# Patient Record
Sex: MALE | Race: WHITE | NOT HISPANIC OR LATINO | URBAN - METROPOLITAN AREA
[De-identification: names, ages, dates, MRNs, and addresses within clinical notes are randomized per-mention and may not be internally consistent; named-entity substitution may affect disease eponyms.]

---

## 2018-06-21 ENCOUNTER — OUTPATIENT (OUTPATIENT)
Dept: OUTPATIENT SERVICES | Facility: HOSPITAL | Age: 68
LOS: 1 days | Discharge: HOME | End: 2018-06-21

## 2018-06-21 DIAGNOSIS — G63 POLYNEUROPATHY IN DISEASES CLASSIFIED ELSEWHERE: ICD-10-CM

## 2018-06-21 DIAGNOSIS — G56.23 LESION OF ULNAR NERVE, BILATERAL UPPER LIMBS: ICD-10-CM

## 2018-06-21 DIAGNOSIS — G72.9 MYOPATHY, UNSPECIFIED: ICD-10-CM

## 2018-06-21 DIAGNOSIS — G82.20 PARAPLEGIA, UNSPECIFIED: ICD-10-CM

## 2018-07-06 ENCOUNTER — OUTPATIENT (OUTPATIENT)
Dept: OUTPATIENT SERVICES | Facility: HOSPITAL | Age: 68
LOS: 1 days | Discharge: HOME | End: 2018-07-06

## 2018-07-06 DIAGNOSIS — D84.9 IMMUNODEFICIENCY, UNSPECIFIED: ICD-10-CM

## 2018-07-06 DIAGNOSIS — A77.40 EHRLICHIOSIS, UNSPECIFIED: ICD-10-CM

## 2018-07-06 DIAGNOSIS — B60.0 BABESIOSIS: ICD-10-CM

## 2018-07-06 DIAGNOSIS — A44.9 BARTONELLOSIS, UNSPECIFIED: ICD-10-CM

## 2018-07-06 DIAGNOSIS — D84.1 DEFECTS IN THE COMPLEMENT SYSTEM: ICD-10-CM

## 2018-07-06 DIAGNOSIS — D68.62 LUPUS ANTICOAGULANT SYNDROME: ICD-10-CM

## 2018-12-31 ENCOUNTER — EMERGENCY (EMERGENCY)
Facility: HOSPITAL | Age: 68
LOS: 0 days | Discharge: HOME | End: 2018-12-31
Attending: EMERGENCY MEDICINE | Admitting: EMERGENCY MEDICINE

## 2018-12-31 VITALS
TEMPERATURE: 101 F | SYSTOLIC BLOOD PRESSURE: 179 MMHG | HEART RATE: 74 BPM | OXYGEN SATURATION: 98 % | RESPIRATION RATE: 18 BRPM | DIASTOLIC BLOOD PRESSURE: 89 MMHG
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
## 2018-12-31 DIAGNOSIS — R53.1 WEAKNESS: ICD-10-CM
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
## 2018-12-31 DIAGNOSIS — G61.0 GUILLAIN-BARRE SYNDROME: ICD-10-CM

## 2018-12-31 DIAGNOSIS — R50.9 FEVER, UNSPECIFIED: ICD-10-CM

## 2018-12-31 DIAGNOSIS — S00.90XA UNSPECIFIED SUPERFICIAL INJURY OF UNSPECIFIED PART OF HEAD, INITIAL ENCOUNTER: ICD-10-CM

## 2018-12-31 DIAGNOSIS — S00.03XA CONTUSION OF SCALP, INITIAL ENCOUNTER: ICD-10-CM

## 2018-12-31 DIAGNOSIS — G35 MULTIPLE SCLEROSIS: ICD-10-CM
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
## 2018-12-31 LAB
ALBUMIN SERPL ELPH-MCNC: 3.5 G/DL — SIGNIFICANT CHANGE UP (ref 3.5–5.2)
ALP SERPL-CCNC: 55 U/L — SIGNIFICANT CHANGE UP (ref 30–115)
ALT FLD-CCNC: 20 U/L — SIGNIFICANT CHANGE UP (ref 0–41)
ANION GAP SERPL CALC-SCNC: 14 MMOL/L — SIGNIFICANT CHANGE UP (ref 7–14)
APPEARANCE UR: CLEAR — SIGNIFICANT CHANGE UP
AST SERPL-CCNC: 27 U/L — SIGNIFICANT CHANGE UP (ref 0–41)
BASOPHILS # BLD AUTO: 0.04 K/UL — SIGNIFICANT CHANGE UP (ref 0–0.2)
BASOPHILS NFR BLD AUTO: 0.4 % — SIGNIFICANT CHANGE UP (ref 0–1)
BILIRUB SERPL-MCNC: 0.5 MG/DL — SIGNIFICANT CHANGE UP (ref 0.2–1.2)
BILIRUB UR-MCNC: ABNORMAL
BUN SERPL-MCNC: 24 MG/DL — HIGH (ref 10–20)
CALCIUM SERPL-MCNC: 8.6 MG/DL — SIGNIFICANT CHANGE UP (ref 8.5–10.1)
CHLORIDE SERPL-SCNC: 104 MMOL/L — SIGNIFICANT CHANGE UP (ref 98–110)
CO2 SERPL-SCNC: 21 MMOL/L — SIGNIFICANT CHANGE UP (ref 17–32)
COLOR SPEC: YELLOW — SIGNIFICANT CHANGE UP
CREAT SERPL-MCNC: 0.9 MG/DL — SIGNIFICANT CHANGE UP (ref 0.7–1.5)
DIFF PNL FLD: NEGATIVE — SIGNIFICANT CHANGE UP
EOSINOPHIL # BLD AUTO: 0.03 K/UL — SIGNIFICANT CHANGE UP (ref 0–0.7)
EOSINOPHIL NFR BLD AUTO: 0.3 % — SIGNIFICANT CHANGE UP (ref 0–8)
EPI CELLS # UR: ABNORMAL /HPF
GLUCOSE SERPL-MCNC: 105 MG/DL — HIGH (ref 70–99)
GLUCOSE UR QL: NEGATIVE — SIGNIFICANT CHANGE UP
HCT VFR BLD CALC: 35.8 % — LOW (ref 42–52)
HGB BLD-MCNC: 10.8 G/DL — LOW (ref 14–18)
IMM GRANULOCYTES NFR BLD AUTO: 0.4 % — HIGH (ref 0.1–0.3)
KETONES UR-MCNC: NEGATIVE — SIGNIFICANT CHANGE UP
LACTATE SERPL-SCNC: 2.1 MMOL/L — SIGNIFICANT CHANGE UP (ref 0.5–2.2)
LEUKOCYTE ESTERASE UR-ACNC: NEGATIVE — SIGNIFICANT CHANGE UP
LIDOCAIN IGE QN: 29 U/L — SIGNIFICANT CHANGE UP (ref 7–60)
LYMPHOCYTES # BLD AUTO: 0.88 K/UL — LOW (ref 1.2–3.4)
LYMPHOCYTES # BLD AUTO: 9.1 % — LOW (ref 20.5–51.1)
MCHC RBC-ENTMCNC: 18.9 PG — LOW (ref 27–31)
MCHC RBC-ENTMCNC: 30.2 G/DL — LOW (ref 32–37)
MCV RBC AUTO: 62.7 FL — LOW (ref 80–94)
MONOCYTES # BLD AUTO: 0.45 K/UL — SIGNIFICANT CHANGE UP (ref 0.1–0.6)
MONOCYTES NFR BLD AUTO: 4.7 % — SIGNIFICANT CHANGE UP (ref 1.7–9.3)
NEUTROPHILS # BLD AUTO: 8.23 K/UL — HIGH (ref 1.4–6.5)
NEUTROPHILS NFR BLD AUTO: 85.1 % — HIGH (ref 42.2–75.2)
NITRITE UR-MCNC: NEGATIVE — SIGNIFICANT CHANGE UP
NRBC # BLD: 0 /100 WBCS — SIGNIFICANT CHANGE UP (ref 0–0)
PH UR: 5.5 — SIGNIFICANT CHANGE UP (ref 5–8)
PLATELET # BLD AUTO: 184 K/UL — SIGNIFICANT CHANGE UP (ref 130–400)
POTASSIUM SERPL-MCNC: 4.3 MMOL/L — SIGNIFICANT CHANGE UP (ref 3.5–5)
POTASSIUM SERPL-SCNC: 4.3 MMOL/L — SIGNIFICANT CHANGE UP (ref 3.5–5)
PROT SERPL-MCNC: 8.8 G/DL — HIGH (ref 6–8)
PROT UR-MCNC: 30
RBC # BLD: 5.71 M/UL — SIGNIFICANT CHANGE UP (ref 4.7–6.1)
RBC # FLD: 17.8 % — HIGH (ref 11.5–14.5)
SODIUM SERPL-SCNC: 139 MMOL/L — SIGNIFICANT CHANGE UP (ref 135–146)
SP GR SPEC: >=1.03 — SIGNIFICANT CHANGE UP (ref 1.01–1.03)
UROBILINOGEN FLD QL: 1 (ref 0.2–0.2)
WBC # BLD: 9.67 K/UL — SIGNIFICANT CHANGE UP (ref 4.8–10.8)
WBC # FLD AUTO: 9.67 K/UL — SIGNIFICANT CHANGE UP (ref 4.8–10.8)

## 2018-12-31 RX ORDER — SODIUM CHLORIDE 9 MG/ML
1000 INJECTION INTRAMUSCULAR; INTRAVENOUS; SUBCUTANEOUS ONCE
Qty: 0 | Refills: 0 | Status: COMPLETED | OUTPATIENT
Start: 2018-12-31 | End: 2018-12-31

## 2018-12-31 RX ORDER — ACETAMINOPHEN 500 MG
650 TABLET ORAL ONCE
Qty: 0 | Refills: 0 | Status: COMPLETED | OUTPATIENT
Start: 2018-12-31 | End: 2018-12-31

## 2018-12-31 RX ADMIN — SODIUM CHLORIDE 1000 MILLILITER(S): 9 INJECTION INTRAMUSCULAR; INTRAVENOUS; SUBCUTANEOUS at 11:44

## 2018-12-31 RX ADMIN — Medication 650 MILLIGRAM(S): at 11:17

## 2018-12-31 NOTE — ED ADULT NURSE NOTE - CAS EDN DISCHARGE INTERVENTIONS
IV placed in hospital removed; pt had IV in place upon arrival for IVIG infusion; IV intact on arrival left in place on discharge for home infusion. MD aware./IV discontinued, cath removed intact

## 2018-12-31 NOTE — ED PROVIDER NOTE - CARE PROVIDERS DIRECT ADDRESSES
[email or web address]

## 2018-12-31 NOTE — ED PROVIDER NOTE - CARE PLAN
Principal Discharge DX:	Fall  Secondary Diagnosis:	Closed head injury  Secondary Diagnosis:	Fever  Secondary Diagnosis:	Guillain-[place]  Secondary Diagnosis:	MS (multiple sclerosis)

## 2018-12-31 NOTE — ED ADULT NURSE NOTE - NSIMPLEMENTINTERV_GEN_ALL_ED
Implemented All Fall with Harm Risk Interventions:  [place] to call system. Call bell, personal items and telephone within reach. Instruct patient to call for assistance. Room bathroom lighting operational. Non-slip footwear when patient is off stretcher. Physically safe environment: no spills, clutter or unnecessary equipment. Stretcher in lowest position, wheels locked, appropriate side rails in place. Provide visual cue, wrist band, yellow gown, etc. Monitor gait and stability. Monitor for mental status changes and reorient to person, place, and time. Review medications for side effects contributing to fall risk. Reinforce activity limits and safety measures with patient and family. Provide visual clues: red socks.

## 2018-12-31 NOTE — ED ADULT TRIAGE NOTE - SPO2 (%)
----- Message from Jamey Weber MD sent at 7/10/2017 10:09 PM CDT -----  Please notify patient with results. Hb / Hct better. Repeat CBC in 6 weeks     98

## 2018-12-31 NOTE — ED ADULT TRIAGE NOTE - CHIEF COMPLAINT QUOTE
Slid and fell down x 10 steps to basement. Denies LOC. Not taking any anticoagulant. Slid and fell down x 10 steps to basement. Denies LOC. Not taking any anticoagulant.   in Triage.

## 2018-12-31 NOTE — ED PROVIDER NOTE - OBJECTIVE STATEMENT
69 y/o male with hx of MS, Guillain-[place]arré, on 5 day infusion of Ig. Today was walking down flight of steps and fell. States he was feeling weak and lost his balance. No chest pain, palpitations, dizziness prior to fall. No LOC. No N/V. 67 y/o male with hx of MS, Guillain-Barré, on 5 day infusion of Ig. Today was walking down flight of steps and fell. States he was feeling weak and lost his balance. No chest pain, palpitations, dizziness prior to fall. No LOC. No N/V. Sustained hematoma to scalp.  Has been complaining of generalized weakness for few days. Found to have low grade fever in ED.

## 2018-12-31 NOTE — ED PROVIDER NOTE - PHYSICAL EXAMINATION
+ hematoma to top of scalp. No c-spine tenderness. PERRL. EOMI. Mildly dry mucous membranes. Lungs CTA and equal b/l. ABD soft, no tenderness. Pelvis stable, no hip tenderness. Extremities with no bony tenderness, no deformity. Back: No vertebral tenderness. Neuro: A&Ox3, GCS 15. CN II-XII intact, strength 3/5 L arm, 5/5 right arm, 4/5 b/l legs, sensation intact and equal, finger-to-nose intact on right, unable to perform on left.  Imp: 1. Mechanical Fall A/P: CT head, c-spine, CXR, pelvis X-ray  2. Fever Ddx includes infection, side-effect of Ig infusion. A/P: Labs, lactate, CXR, U/A. IV fluids, anti-pyretics.   Will re-assess.

## 2018-12-31 NOTE — ED ADULT NURSE REASSESSMENT NOTE - NS ED NURSE REASSESS COMMENT FT1
Per patient's wife, spoke to patient's neurologist, states they will d/c at home treatment for Guillian[place], okay to take out IV that patient came in with. IV discontinued. Dr. Sepulveda aware.

## 2018-12-31 NOTE — ED PROVIDER NOTE - MEDICAL DECISION MAKING DETAILS
Imaging with no acute injury. (Possible chronic SDH). No sign of infection. Fever likely secondary to Ig infusion. Can D/C. Will f/u with [facility] and neurology Dr. Palomino. Imaging with no acute injury. (Possible chronic SDH). Temperature and lactate noted. No sign of infection. Fever likely secondary to Ig infusion. Can D/C. Will f/u with GIRISH Osorio and neurology Dr. Palomino.

## 2018-12-31 NOTE — ED PROVIDER NOTE - CARE PROVIDER_API CALL
Scott Osorio (MD), [id number]  [address] [place] [address]  Phone: [phone]  Fax: [phone]    Ronald Palomino), Neurology; Neurophysiology  [address] [place] [address]  Phone: [phone]  Fax: [phone]

## 2019-05-30 ENCOUNTER — OUTPATIENT (OUTPATIENT)
Dept: OUTPATIENT SERVICES | Facility: HOSPITAL | Age: 69
LOS: 1 days | Discharge: HOME | End: 2019-05-30

## 2019-05-30 DIAGNOSIS — G35 MULTIPLE SCLEROSIS: ICD-10-CM

## 2019-05-30 DIAGNOSIS — G61.81 CHRONIC INFLAMMATORY DEMYELINATING POLYNEURITIS: ICD-10-CM

## 2019-05-30 DIAGNOSIS — E53.8 DEFICIENCY OF OTHER SPECIFIED B GROUP VITAMINS: ICD-10-CM

## 2019-05-30 PROBLEM — G61.0 GUILLAIN-BARRE SYNDROME: Chronic | Status: ACTIVE | Noted: 2018-12-31

## 2019-12-05 ENCOUNTER — OUTPATIENT (OUTPATIENT)
Dept: OUTPATIENT SERVICES | Facility: HOSPITAL | Age: 69
LOS: 1 days | Discharge: HOME | End: 2019-12-05

## 2019-12-05 DIAGNOSIS — G61.81 CHRONIC INFLAMMATORY DEMYELINATING POLYNEURITIS: ICD-10-CM

## 2019-12-05 DIAGNOSIS — G35 MULTIPLE SCLEROSIS: ICD-10-CM

## 2023-12-07 ENCOUNTER — NON-APPOINTMENT (OUTPATIENT)
Age: 73
End: 2023-12-07

## 2023-12-07 DIAGNOSIS — E53.8 DEFICIENCY OF OTHER SPECIFIED B GROUP VITAMINS: ICD-10-CM

## 2023-12-07 DIAGNOSIS — Z87.891 PERSONAL HISTORY OF NICOTINE DEPENDENCE: ICD-10-CM

## 2023-12-07 DIAGNOSIS — Z87.898 PERSONAL HISTORY OF OTHER SPECIFIED CONDITIONS: ICD-10-CM

## 2023-12-07 DIAGNOSIS — G35 MULTIPLE SCLEROSIS: ICD-10-CM

## 2023-12-07 DIAGNOSIS — Z86.69 PERSONAL HISTORY OF OTHER DISEASES OF THE NERVOUS SYSTEM AND SENSE ORGANS: ICD-10-CM
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
## 2023-12-07 DIAGNOSIS — G61.81 CHRONIC INFLAMMATORY DEMYELINATING POLYNEURITIS: ICD-10-CM
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
## 2023-12-07 PROBLEM — Z00.00 ENCOUNTER FOR PREVENTIVE HEALTH EXAMINATION: Status: ACTIVE | Noted: 2023-12-07

## 2023-12-07 RX ORDER — CYANOCOBALAMIN (VITAMIN B-12) 1000 MCG
1000 TABLET ORAL
Refills: 0 | Status: ACTIVE | COMMUNITY